# Patient Record
Sex: FEMALE | Race: WHITE | NOT HISPANIC OR LATINO
[De-identification: names, ages, dates, MRNs, and addresses within clinical notes are randomized per-mention and may not be internally consistent; named-entity substitution may affect disease eponyms.]

---

## 2017-11-28 ENCOUNTER — TRANSCRIPTION ENCOUNTER (OUTPATIENT)
Age: 67
End: 2017-11-28

## 2018-05-01 ENCOUNTER — TRANSCRIPTION ENCOUNTER (OUTPATIENT)
Age: 68
End: 2018-05-01

## 2018-05-06 ENCOUNTER — TRANSCRIPTION ENCOUNTER (OUTPATIENT)
Age: 68
End: 2018-05-06

## 2018-12-02 ENCOUNTER — TRANSCRIPTION ENCOUNTER (OUTPATIENT)
Age: 68
End: 2018-12-02

## 2022-05-02 ENCOUNTER — NON-APPOINTMENT (OUTPATIENT)
Age: 72
End: 2022-05-02

## 2022-05-09 ENCOUNTER — NON-APPOINTMENT (OUTPATIENT)
Age: 72
End: 2022-05-09

## 2022-05-09 ENCOUNTER — APPOINTMENT (OUTPATIENT)
Dept: OPHTHALMOLOGY | Facility: CLINIC | Age: 72
End: 2022-05-09

## 2022-06-14 ENCOUNTER — NON-APPOINTMENT (OUTPATIENT)
Age: 72
End: 2022-06-14

## 2022-06-14 ENCOUNTER — APPOINTMENT (OUTPATIENT)
Dept: OPHTHALMOLOGY | Facility: CLINIC | Age: 72
End: 2022-06-14

## 2024-09-06 ENCOUNTER — NON-APPOINTMENT (OUTPATIENT)
Age: 74
End: 2024-09-06

## 2024-09-19 ENCOUNTER — APPOINTMENT (OUTPATIENT)
Dept: OPHTHALMOLOGY | Facility: CLINIC | Age: 74
End: 2024-09-19

## 2024-09-19 ENCOUNTER — NON-APPOINTMENT (OUTPATIENT)
Age: 74
End: 2024-09-19

## 2025-01-08 ENCOUNTER — APPOINTMENT (OUTPATIENT)
Dept: OPHTHALMOLOGY | Facility: AMBULATORY SURGERY CENTER | Age: 75
End: 2025-01-08

## 2025-01-09 ENCOUNTER — APPOINTMENT (OUTPATIENT)
Dept: OPHTHALMOLOGY | Facility: CLINIC | Age: 75
End: 2025-01-09

## 2025-01-28 ENCOUNTER — APPOINTMENT (OUTPATIENT)
Dept: OPHTHALMOLOGY | Facility: AMBULATORY SURGERY CENTER | Age: 75
End: 2025-01-28

## 2025-01-29 ENCOUNTER — APPOINTMENT (OUTPATIENT)
Dept: OPHTHALMOLOGY | Facility: CLINIC | Age: 75
End: 2025-01-29

## 2025-03-10 ENCOUNTER — OUTPATIENT (OUTPATIENT)
Dept: OUTPATIENT SERVICES | Facility: HOSPITAL | Age: 75
LOS: 1 days | End: 2025-03-10

## 2025-03-10 ENCOUNTER — APPOINTMENT (OUTPATIENT)
Dept: OPHTHALMOLOGY | Facility: CLINIC | Age: 75
End: 2025-03-10

## 2025-03-11 ENCOUNTER — APPOINTMENT (OUTPATIENT)
Dept: OPHTHALMOLOGY | Facility: CLINIC | Age: 75
End: 2025-03-11

## 2025-03-11 DIAGNOSIS — H26.9 UNSPECIFIED CATARACT: ICD-10-CM

## 2025-04-08 NOTE — ASU PATIENT PROFILE, ADULT - FALL HARM RISK - HARM RISK INTERVENTIONS

## 2025-04-08 NOTE — ASU PATIENT PROFILE, ADULT - NSICDXPASTMEDICALHX_GEN_ALL_CORE_FT
PAST MEDICAL HISTORY:  Anxiety and depression      PAST MEDICAL HISTORY:  Anxiety and depression     Bilateral vocal cord paralysis one came back    Lung cancer     Migraines

## 2025-04-08 NOTE — ASU PATIENT PROFILE, ADULT - NS PREOP UNDERSTANDS INFO
NPO/NO solid foods after 2200 pm  tonight, water , apple  till 12MN,    dress comfortable no   jewelry,  Bring ID photo and insurance cards,  escort  address and  telephone given/yes

## 2025-04-09 ENCOUNTER — TRANSCRIPTION ENCOUNTER (OUTPATIENT)
Age: 75
End: 2025-04-09

## 2025-04-09 ENCOUNTER — NON-APPOINTMENT (OUTPATIENT)
Age: 75
End: 2025-04-09

## 2025-04-09 ENCOUNTER — OUTPATIENT (OUTPATIENT)
Dept: OUTPATIENT SERVICES | Facility: HOSPITAL | Age: 75
LOS: 1 days | Discharge: HOME | End: 2025-04-09

## 2025-04-09 ENCOUNTER — APPOINTMENT (OUTPATIENT)
Dept: OPHTHALMOLOGY | Facility: AMBULATORY SURGERY CENTER | Age: 75
End: 2025-04-09

## 2025-04-09 VITALS
OXYGEN SATURATION: 97 % | RESPIRATION RATE: 16 BRPM | DIASTOLIC BLOOD PRESSURE: 52 MMHG | SYSTOLIC BLOOD PRESSURE: 96 MMHG | TEMPERATURE: 99 F | HEART RATE: 72 BPM

## 2025-04-09 VITALS
HEART RATE: 80 BPM | HEIGHT: 65 IN | OXYGEN SATURATION: 96 % | RESPIRATION RATE: 16 BRPM | TEMPERATURE: 99 F | SYSTOLIC BLOOD PRESSURE: 112 MMHG | DIASTOLIC BLOOD PRESSURE: 57 MMHG | WEIGHT: 126.1 LBS

## 2025-04-09 DIAGNOSIS — Z90.89 ACQUIRED ABSENCE OF OTHER ORGANS: Chronic | ICD-10-CM

## 2025-04-09 DIAGNOSIS — Z98.890 OTHER SPECIFIED POSTPROCEDURAL STATES: Chronic | ICD-10-CM

## 2025-04-09 DEVICE — LENS IOL TECNIS SMPLCTY 1PC CLR MONO DCB0000 19.5D
Type: IMPLANTABLE DEVICE | Site: LEFT | Status: NON-FUNCTIONAL
Removed: 2025-04-09

## 2025-04-09 RX ORDER — ONDANSETRON HCL/PF 4 MG/2 ML
4 VIAL (ML) INJECTION ONCE
Refills: 0 | Status: DISCONTINUED | OUTPATIENT
Start: 2025-04-09 | End: 2025-04-09

## 2025-04-09 RX ORDER — CHLOROQUINE PHOSPHATE
1 POWDER (GRAM) MISCELLANEOUS
Refills: 0 | Status: COMPLETED | OUTPATIENT
Start: 2025-04-09 | End: 2025-04-09

## 2025-04-09 RX ORDER — TROPICAMIDE
1 POWDER (GRAM) MISCELLANEOUS
Refills: 0 | Status: COMPLETED | OUTPATIENT
Start: 2025-04-09 | End: 2025-04-09

## 2025-04-09 RX ORDER — ACETAMINOPHEN 500 MG/5ML
1000 LIQUID (ML) ORAL ONCE
Refills: 0 | Status: DISCONTINUED | OUTPATIENT
Start: 2025-04-09 | End: 2025-04-09

## 2025-04-09 RX ORDER — OFLOXACIN 3 MG/ML
1 SOLUTION OPHTHALMIC
Refills: 0 | Status: COMPLETED | OUTPATIENT
Start: 2025-04-09 | End: 2025-04-09

## 2025-04-09 RX ORDER — PHENYLEPHRINE HYDROCHLORIDE 25 MG/ML
1 SOLUTION OPHTHALMIC
Refills: 0 | Status: COMPLETED | OUTPATIENT
Start: 2025-04-09 | End: 2025-04-09

## 2025-04-09 RX ORDER — SODIUM CHLORIDE 9 G/1000ML
500 INJECTION, SOLUTION INTRAVENOUS
Refills: 0 | Status: DISCONTINUED | OUTPATIENT
Start: 2025-04-09 | End: 2025-04-09

## 2025-04-09 RX ORDER — TETRACAINE HYDROCHLORIDE 5 MG/ML
1 SOLUTION OPHTHALMIC ONCE
Refills: 0 | Status: COMPLETED | OUTPATIENT
Start: 2025-04-09 | End: 2025-04-09

## 2025-04-09 RX ADMIN — Medication 1 DROP(S): at 06:54

## 2025-04-09 RX ADMIN — Medication 1 DROP(S): at 06:43

## 2025-04-09 RX ADMIN — OFLOXACIN 1 DROP(S): 3 SOLUTION OPHTHALMIC at 06:50

## 2025-04-09 RX ADMIN — PHENYLEPHRINE HYDROCHLORIDE 1 DROP(S): 25 SOLUTION OPHTHALMIC at 06:49

## 2025-04-09 RX ADMIN — Medication 1 DROP(S): at 06:50

## 2025-04-09 RX ADMIN — OFLOXACIN 1 DROP(S): 3 SOLUTION OPHTHALMIC at 06:55

## 2025-04-09 RX ADMIN — TETRACAINE HYDROCHLORIDE 1 DROP(S): 5 SOLUTION OPHTHALMIC at 06:42

## 2025-04-09 RX ADMIN — PHENYLEPHRINE HYDROCHLORIDE 1 DROP(S): 25 SOLUTION OPHTHALMIC at 06:43

## 2025-04-09 RX ADMIN — PHENYLEPHRINE HYDROCHLORIDE 1 DROP(S): 25 SOLUTION OPHTHALMIC at 06:54

## 2025-04-09 RX ADMIN — OFLOXACIN 1 DROP(S): 3 SOLUTION OPHTHALMIC at 06:43

## 2025-04-09 NOTE — ASU DISCHARGE PLAN (ADULT/PEDIATRIC) - NS MD DC FALL RISK RISK
For information on Fall & Injury Prevention, visit: [email or web address] OR  [email or web address]/[email or web address] OR  https://www.cdc.gov/steadi/patient.html

## 2025-04-09 NOTE — ASU DISCHARGE PLAN (ADULT/PEDIATRIC) - FINANCIAL ASSISTANCE
[facility] provides services at a reduced cost to those who are determined to be eligible through [facility]’s financial assistance program. Information regarding [facility]’s financial assistance program can be found by going to [email or web address] or by calling [phone].

## 2025-04-09 NOTE — OPERATIVE REPORT - OPERATIVE RPOSRT DETAILS
DATE: April 9, 2025    ATTENDING: Mary Butterfield MD    PREOPERATIVE DIAGNOSIS(ES):  Nuclear Sclerosis Cataract, Left eye.    POSTOPERATIVE DIAGNOSIS(ES):  Nuclear Sclerosis Cataract, Left eye.    OPERATION:  Phacoemulsification with posterior chamber lens implant, DCB00 +19.50D  Left eye.    COMPLICATIONS:  None.    ESTIMATED BLOOD LOSS: <1 cc    SPECIMEN/TISSUE REMOVED: None    DESCRIPTION OF PROCEDURE:  The patient was brought to the operating room and the patient was prepped and draped in the usual sterile fashion, including Betadine drops in the eye.  A drop of topical anesthetic was placed in the eye.  Steridrapes were used to cover the lashes and a  lid speculum was placed between the lids.  A 1mm blade and Rosas ring were used to create paracenteses at the 3 and 9 oclock positions.  Intracameral preservative free lidocaine and epinephrine were instilled followed by a Viscoat.  A clear cornea superior incision was created using a 2.6mm metal bladed keratome.  A continuous curvilinear capsulorhexis was started with a 27 prebent cystotome and completed with capsulorhexis forceps.  The lens was hydrodissected with BSS.  The lens was then phacoemulsified using a divide and conquer technique.  Residual cortical material was removed using automated irrigation and aspiration.  The capsular bag was reformed using a viscoelastic.  A DCB00 lens was inserted into the bag and the superior haptic was gently dunked beneath the anterior capsule using a straight sinsky hook and rotated into place.  Symmetric capsular bag fixation was confirmed.  The remaining viscoelastic was removed with automated irrigation and aspiration.  The wounds were hydrated and checked to be water tight. The eye was left at a normotensive pressure  The lid speculum was removed.  Tobradex drops and ointment were instilled in the eye. A clear shield was placed over the eye. The patient left the OR in stable condition having tolerated the procedure well.  IMary, was present throughout the case.

## 2025-04-10 ENCOUNTER — APPOINTMENT (OUTPATIENT)
Dept: OPHTHALMOLOGY | Facility: CLINIC | Age: 75
End: 2025-04-10

## 2025-04-10 ENCOUNTER — NON-APPOINTMENT (OUTPATIENT)
Age: 75
End: 2025-04-10

## 2025-04-10 PROBLEM — C34.90 MALIGNANT NEOPLASM OF UNSPECIFIED PART OF UNSPECIFIED BRONCHUS OR LUNG: Chronic | Status: ACTIVE | Noted: 2025-04-09

## 2025-04-10 PROBLEM — G43.909 MIGRAINE, UNSPECIFIED, NOT INTRACTABLE, WITHOUT STATUS MIGRAINOSUS: Chronic | Status: ACTIVE | Noted: 2025-04-09

## 2025-04-10 PROBLEM — J38.02: Chronic | Status: ACTIVE | Noted: 2025-04-09

## 2025-04-17 ENCOUNTER — NON-APPOINTMENT (OUTPATIENT)
Age: 75
End: 2025-04-17

## 2025-04-17 ENCOUNTER — APPOINTMENT (OUTPATIENT)
Dept: OPHTHALMOLOGY | Facility: CLINIC | Age: 75
End: 2025-04-17

## 2025-04-21 NOTE — ASU PATIENT PROFILE, ADULT - NS PRO AD ANY ON CHART
Physical assessment performed. Patient was examined and history and physical was reviewed prior to procedure. No changes to H+P since the H+ P was completed.     Discussed with attending, Dr. Fleischer Neveen Abuali, DPM  Podiatric Medicine and Surgery, PGY-2  Perfect Serve  
not signed original home/Yes

## 2025-04-21 NOTE — ASU PATIENT PROFILE, ADULT - NS PREOP UNDERSTANDS INFO
Time by MD; No solid food/dairy/candy/gum after 11:30pm tonight; water allowed before 04:30am tomorrow; patient reminded to come with photo ID/insurance/credit card; dress in comfortable clothes; no jewelries/contact lens/valuable; no smoking/alcohol drinking/recreational drug use today; escort to have photo ID; address and callback number was given/yes

## 2025-04-21 NOTE — ASU PATIENT PROFILE, ADULT - FALL HARM RISK - UNIVERSAL INTERVENTIONS
Bed in lowest position, wheels locked, appropriate side rails in place/Call bell, personal items and telephone in reach/Instruct patient to call for assistance before getting out of bed or chair/Non-slip footwear when patient is out of bed/Wells to call system/Physically safe environment - no spills, clutter or unnecessary equipment/Purposeful Proactive Rounding/Room/bathroom lighting operational, light cord in reach

## 2025-04-21 NOTE — ASU PATIENT PROFILE, ADULT - NSICDXPASTMEDICALHX_GEN_ALL_CORE_FT
PAST MEDICAL HISTORY:  Anxiety and depression     Bilateral vocal cord paralysis one came back    Lung cancer     Migraines

## 2025-04-21 NOTE — ASU PATIENT PROFILE, ADULT - NSICDXPASTSURGICALHX_GEN_ALL_CORE_FT
PAST SURGICAL HISTORY:  History of facelift     S/P cataract surgery left    S/P tonsillectomy      PAST SURGICAL HISTORY:  History of facelift     S/P cataract surgery left    S/P lobectomy of lung     S/P tonsillectomy

## 2025-04-22 ENCOUNTER — OUTPATIENT (OUTPATIENT)
Dept: OUTPATIENT SERVICES | Facility: HOSPITAL | Age: 75
LOS: 1 days | Discharge: HOME | End: 2025-04-22

## 2025-04-22 ENCOUNTER — NON-APPOINTMENT (OUTPATIENT)
Age: 75
End: 2025-04-22

## 2025-04-22 ENCOUNTER — TRANSCRIPTION ENCOUNTER (OUTPATIENT)
Age: 75
End: 2025-04-22

## 2025-04-22 ENCOUNTER — APPOINTMENT (OUTPATIENT)
Dept: OPHTHALMOLOGY | Facility: AMBULATORY SURGERY CENTER | Age: 75
End: 2025-04-22

## 2025-04-22 VITALS
DIASTOLIC BLOOD PRESSURE: 53 MMHG | TEMPERATURE: 98 F | OXYGEN SATURATION: 97 % | HEART RATE: 69 BPM | SYSTOLIC BLOOD PRESSURE: 95 MMHG | RESPIRATION RATE: 16 BRPM

## 2025-04-22 VITALS
SYSTOLIC BLOOD PRESSURE: 112 MMHG | HEART RATE: 97 BPM | HEIGHT: 64 IN | OXYGEN SATURATION: 98 % | TEMPERATURE: 98 F | RESPIRATION RATE: 15 BRPM | DIASTOLIC BLOOD PRESSURE: 69 MMHG | WEIGHT: 124.34 LBS

## 2025-04-22 DIAGNOSIS — Z98.49 CATARACT EXTRACTION STATUS, UNSPECIFIED EYE: Chronic | ICD-10-CM

## 2025-04-22 DIAGNOSIS — Z90.89 ACQUIRED ABSENCE OF OTHER ORGANS: Chronic | ICD-10-CM

## 2025-04-22 DIAGNOSIS — Z90.2 ACQUIRED ABSENCE OF LUNG [PART OF]: Chronic | ICD-10-CM

## 2025-04-22 DIAGNOSIS — Z98.890 OTHER SPECIFIED POSTPROCEDURAL STATES: Chronic | ICD-10-CM

## 2025-04-22 DEVICE — LENS IOL TECNIS SMPLCTY 1PC CLR MONO DCB0000 20.0D
Type: IMPLANTABLE DEVICE | Site: RIGHT | Status: NON-FUNCTIONAL
Removed: 2025-04-22

## 2025-04-22 RX ORDER — SODIUM CHLORIDE 9 G/1000ML
500 INJECTION, SOLUTION INTRAVENOUS
Refills: 0 | Status: DISCONTINUED | OUTPATIENT
Start: 2025-04-22 | End: 2025-04-22

## 2025-04-22 RX ORDER — TETRACAINE HYDROCHLORIDE 5 MG/ML
1 SOLUTION OPHTHALMIC ONCE
Refills: 0 | Status: COMPLETED | OUTPATIENT
Start: 2025-04-22 | End: 2025-04-22

## 2025-04-22 RX ORDER — OFLOXACIN 3 MG/ML
1 SOLUTION OPHTHALMIC
Refills: 0 | Status: COMPLETED | OUTPATIENT
Start: 2025-04-22 | End: 2025-04-22

## 2025-04-22 RX ORDER — TRAZODONE HCL 100 MG
0 TABLET ORAL
Refills: 0 | DISCHARGE

## 2025-04-22 RX ORDER — CHLOROQUINE PHOSPHATE
1 POWDER (GRAM) MISCELLANEOUS
Refills: 0 | Status: COMPLETED | OUTPATIENT
Start: 2025-04-22 | End: 2025-04-22

## 2025-04-22 RX ORDER — TOPIRAMATE 25 MG/1
1 TABLET, FILM COATED ORAL
Refills: 0 | DISCHARGE

## 2025-04-22 RX ORDER — SUMATRIPTAN 100 MG/1
1 TABLET, FILM COATED ORAL
Refills: 0 | DISCHARGE

## 2025-04-22 RX ORDER — PHENYLEPHRINE HYDROCHLORIDE 25 MG/ML
1 SOLUTION OPHTHALMIC
Refills: 0 | Status: COMPLETED | OUTPATIENT
Start: 2025-04-22 | End: 2025-04-22

## 2025-04-22 RX ORDER — ACETAMINOPHEN 500 MG/5ML
650 LIQUID (ML) ORAL ONCE
Refills: 0 | Status: DISCONTINUED | OUTPATIENT
Start: 2025-04-22 | End: 2025-04-22

## 2025-04-22 RX ORDER — TROPICAMIDE
1 POWDER (GRAM) MISCELLANEOUS
Refills: 0 | Status: COMPLETED | OUTPATIENT
Start: 2025-04-22 | End: 2025-04-22

## 2025-04-22 RX ORDER — BUPROPION HYDROBROMIDE 522 MG/1
1 TABLET, EXTENDED RELEASE ORAL
Refills: 0 | DISCHARGE

## 2025-04-22 RX ADMIN — TETRACAINE HYDROCHLORIDE 1 DROP(S): 5 SOLUTION OPHTHALMIC at 07:08

## 2025-04-22 RX ADMIN — Medication 1 DROP(S): at 07:08

## 2025-04-22 RX ADMIN — PHENYLEPHRINE HYDROCHLORIDE 1 DROP(S): 25 SOLUTION OPHTHALMIC at 07:18

## 2025-04-22 RX ADMIN — Medication 1 DROP(S): at 07:18

## 2025-04-22 RX ADMIN — Medication 1 DROP(S): at 07:13

## 2025-04-22 RX ADMIN — PHENYLEPHRINE HYDROCHLORIDE 1 DROP(S): 25 SOLUTION OPHTHALMIC at 07:08

## 2025-04-22 RX ADMIN — PHENYLEPHRINE HYDROCHLORIDE 1 DROP(S): 25 SOLUTION OPHTHALMIC at 07:13

## 2025-04-22 RX ADMIN — OFLOXACIN 1 DROP(S): 3 SOLUTION OPHTHALMIC at 07:18

## 2025-04-22 RX ADMIN — OFLOXACIN 1 DROP(S): 3 SOLUTION OPHTHALMIC at 07:14

## 2025-04-22 RX ADMIN — OFLOXACIN 1 DROP(S): 3 SOLUTION OPHTHALMIC at 07:08

## 2025-04-22 NOTE — ASU PREOP CHECKLIST - IDENTIFICATION BAND VERIFIED
Vitals capture started with the following parameters, Patient=Adult, Interval=5 min, Initial Pressure=180 mmHg, Deflation Rate=5 mmHg done

## 2025-04-22 NOTE — ASU DISCHARGE PLAN (ADULT/PEDIATRIC) - FINANCIAL ASSISTANCE
[facility] provides services at a reduced cost to those who are determined to be eligible through [facility]’s financial assistance program. Information regarding [facility]’s financial assistance program can be found by going to [email or web address]/assistance or by calling [phone].

## 2025-04-22 NOTE — ASU DISCHARGE PLAN (ADULT/PEDIATRIC) - NS MD DC FALL RISK RISK
For information on Fall & Injury Prevention, visit: [email or web address]-[email or web address] OR  [email or web address]-[email or web address]-injury OR  https://www.cdc.gov/steadi/patient.html

## 2025-04-22 NOTE — OPERATIVE REPORT - OPERATIVE RPOSRT DETAILS
DATE: April 22, 2025    ATTENDING: Mary Butterfield MD    PREOPERATIVE DIAGNOSIS(ES):  Nuclear Sclerosis Cataract, Right eye.    POSTOPERATIVE DIAGNOSIS(ES): Nuclear Sclerosis  Cataract, Right eye.    OPERATION:  Phacoemulsification with posterior chamber lens implant, DCB00+20.0D  Right eye.    COMPLICATIONS:  None.    ESTIMATED BLOOD LOSS: <1 cc    SPECIMEN/TISSUE REMOVED: None    DESCRIPTION OF PROCEDURE:  The patient was brought to the operating room and the patient was prepped and draped in the usual sterile fashion, including Betadine drops in the eye.  A drop of topical anesthetic was placed in the eye.  Steridrapes were used to cover the lashes and a  lid speculum was placed between the lids.  A 1mm Blade  and Rosas ring were used to create paracenteses at the 3 and 9 oclock positions.  Intracameral preservative free lidocaine and epinephrine were instilled followed by a Viscoat.  A clear cornea superior incision was created using a 2.6mm metal bladed keratome.  A continuous curvilinear capsulorhexis was started with a 27 prebent cystotome and completed with capsulorhexis forceps.  The lens was hydrodissected with BSS.  The lens was then phacoemulsified using a divide and conquer technique.  Residual cortical material was removed using automated irrigation and aspiration.  The capsular bag was reformed using a viscoelastic.  A DCB00 IOL lens was inserted into the bag and the superior haptic was gently dunked beneath the anterior capsule using a straight sinsky hook and rotated into place.  Symmetric capsular bag fixation was confirmed.  The remaining viscoelastic was removed with automated irrigation and aspiration.  The wounds were hydrated and checked to be water tight. The eye was left at a normotensive pressure  The lid speculum was removed.  Tobradex drops and ointment were instilled in the eye.  A clear shield was placed over the eye. The patient left the OR in stable condition having tolerated the procedure well.  I, Mary Butterfield, was present throughout the case.

## 2025-04-23 ENCOUNTER — APPOINTMENT (OUTPATIENT)
Dept: OPHTHALMOLOGY | Facility: CLINIC | Age: 75
End: 2025-04-23

## 2025-04-23 ENCOUNTER — NON-APPOINTMENT (OUTPATIENT)
Age: 75
End: 2025-04-23

## 2025-05-29 ENCOUNTER — APPOINTMENT (OUTPATIENT)
Dept: OPHTHALMOLOGY | Facility: CLINIC | Age: 75
End: 2025-05-29

## 2025-05-29 ENCOUNTER — NON-APPOINTMENT (OUTPATIENT)
Age: 75
End: 2025-05-29

## (undated) DEVICE — SUT NYLON 10-0 12" CU-5

## (undated) DEVICE — KNIFE ALCON PARACENTESIS CLEARCUT SIDEPORT 1MM (YELLOW)

## (undated) DEVICE — DRAPE MICROSCOPE KNOB COVER SMALL (2 PCS)

## (undated) DEVICE — DRSG CURITY GAUZE SPONGE 4 X 4" 12-PLY

## (undated) DEVICE — CANNULA IRR ALCON ANTERIOR CHAMBER 30G

## (undated) DEVICE — DRSG TAPE MICROPORE SURGICAL TAPE .5"X10 YDS TAN

## (undated) DEVICE — KNIFE ALCON SLIT CLEARCUT SATIN SINGLE BEVEL 2.6MM (PINK)

## (undated) DEVICE — Device

## (undated) DEVICE — SOL IRR BAL SALT 500ML

## (undated) DEVICE — GLV 6.5 PROTEXIS (WHITE)

## (undated) DEVICE — KIT CENTURION ANTERIOR

## (undated) DEVICE — PACK CENTURION 2.75MM

## (undated) DEVICE — PACK ANTERIOR SEGMENT

## (undated) DEVICE — APPLICATOR COTTON TIP 3" STERILE

## (undated) DEVICE — NUCLEUS HYDRODISSECTOR PEARCE ANGLED 25G X 22MM

## (undated) DEVICE — SYR LUER LOK 3CC